# Patient Record
Sex: FEMALE | Race: WHITE | NOT HISPANIC OR LATINO | Employment: OTHER | URBAN - METROPOLITAN AREA
[De-identification: names, ages, dates, MRNs, and addresses within clinical notes are randomized per-mention and may not be internally consistent; named-entity substitution may affect disease eponyms.]

---

## 2024-02-12 ENCOUNTER — OFFICE VISIT (OUTPATIENT)
Dept: OTOLARYNGOLOGY | Facility: CLINIC | Age: 89
End: 2024-02-12
Payer: MEDICARE

## 2024-02-12 ENCOUNTER — CLINICAL SUPPORT (OUTPATIENT)
Dept: AUDIOLOGY | Facility: CLINIC | Age: 89
End: 2024-02-12
Payer: MEDICARE

## 2024-02-12 VITALS — BODY MASS INDEX: 33.26 KG/M2 | WEIGHT: 165 LBS | HEIGHT: 59 IN

## 2024-02-12 DIAGNOSIS — H90.3 SENSORINEURAL HEARING LOSS (SNHL) OF BOTH EARS: Primary | ICD-10-CM

## 2024-02-12 DIAGNOSIS — H61.21 IMPACTED CERUMEN OF RIGHT EAR: ICD-10-CM

## 2024-02-12 DIAGNOSIS — H69.92 DYSFUNCTION OF LEFT EUSTACHIAN TUBE: ICD-10-CM

## 2024-02-12 DIAGNOSIS — H93.13 TINNITUS OF BOTH EARS: ICD-10-CM

## 2024-02-12 PROBLEM — H43.813 PVD (POSTERIOR VITREOUS DETACHMENT), BILATERAL: Status: ACTIVE | Noted: 2017-12-01

## 2024-02-12 PROBLEM — I48.0 PAF (PAROXYSMAL ATRIAL FIBRILLATION) (MULTI): Status: ACTIVE | Noted: 2019-10-31

## 2024-02-12 PROBLEM — H35.3290 EXUDATIVE AGE-RELATED MACULAR DEGENERATION (MULTI): Status: ACTIVE | Noted: 2018-12-10

## 2024-02-12 PROBLEM — H04.123 DRY EYE SYNDROME OF BILATERAL LACRIMAL GLANDS: Status: ACTIVE | Noted: 2021-01-06

## 2024-02-12 PROBLEM — E66.811 OBESITY, CLASS I, BMI 30-34.9: Chronic | Status: ACTIVE | Noted: 2019-06-15

## 2024-02-12 PROBLEM — N18.30 STAGE 3 CHRONIC KIDNEY DISEASE (MULTI): Status: ACTIVE | Noted: 2019-10-31

## 2024-02-12 PROBLEM — H61.23 BILATERAL IMPACTED CERUMEN: Status: ACTIVE | Noted: 2024-02-12

## 2024-02-12 PROBLEM — E66.9 OBESITY, CLASS I, BMI 30-34.9: Chronic | Status: ACTIVE | Noted: 2019-06-15

## 2024-02-12 PROBLEM — H35.3112 NONEXUDATIVE AGE-RELATED MACULAR DEGENERATION, RIGHT EYE, INTERMEDIATE DRY STAGE: Status: ACTIVE | Noted: 2017-07-10

## 2024-02-12 PROBLEM — R94.39 ABNORMAL NUCLEAR STRESS TEST: Status: ACTIVE | Noted: 2019-10-31

## 2024-02-12 PROBLEM — H93.293 ABNORMAL AUDITORY PERCEPTION OF BOTH EARS: Status: ACTIVE | Noted: 2024-02-12

## 2024-02-12 PROBLEM — Z96.1 PSEUDOPHAKIA OF BOTH EYES: Status: ACTIVE | Noted: 2018-12-10

## 2024-02-12 PROBLEM — I25.10 CORONARY ARTERY DISEASE INVOLVING NATIVE CORONARY ARTERY OF NATIVE HEART WITHOUT ANGINA PECTORIS: Status: ACTIVE | Noted: 2019-10-31

## 2024-02-12 PROBLEM — I51.9 LEFT VENTRICULAR DYSFUNCTION: Status: ACTIVE | Noted: 2019-10-31

## 2024-02-12 PROBLEM — H74.03 TYMPANOSCLEROSIS, BILATERAL: Status: ACTIVE | Noted: 2024-02-12

## 2024-02-12 PROBLEM — I70.0 ATHEROSCLEROSIS OF AORTA (CMS-HCC): Status: ACTIVE | Noted: 2024-02-12

## 2024-02-12 PROCEDURE — 99214 OFFICE O/P EST MOD 30 MIN: CPT | Performed by: OTOLARYNGOLOGY

## 2024-02-12 PROCEDURE — 1036F TOBACCO NON-USER: CPT | Performed by: OTOLARYNGOLOGY

## 2024-02-12 PROCEDURE — 1160F RVW MEDS BY RX/DR IN RCRD: CPT | Performed by: OTOLARYNGOLOGY

## 2024-02-12 PROCEDURE — 92557 COMPREHENSIVE HEARING TEST: CPT | Performed by: AUDIOLOGIST

## 2024-02-12 PROCEDURE — 92567 TYMPANOMETRY: CPT | Performed by: AUDIOLOGIST

## 2024-02-12 PROCEDURE — 1159F MED LIST DOCD IN RCRD: CPT | Performed by: OTOLARYNGOLOGY

## 2024-02-12 RX ORDER — FUROSEMIDE 20 MG/1
TABLET ORAL
COMMUNITY
Start: 2020-01-19

## 2024-02-12 RX ORDER — VIT A/VIT C/VIT E/ZINC/COPPER 2148-113
TABLET ORAL EVERY 12 HOURS
COMMUNITY

## 2024-02-12 RX ORDER — ACETAMINOPHEN 500 MG
TABLET ORAL EVERY 24 HOURS
COMMUNITY

## 2024-02-12 RX ORDER — PANTOPRAZOLE SODIUM 40 MG/1
40 TABLET, DELAYED RELEASE ORAL DAILY
COMMUNITY

## 2024-02-12 RX ORDER — LEVOTHYROXINE SODIUM 100 UG/1
1 TABLET ORAL DAILY
COMMUNITY
Start: 2022-01-14

## 2024-02-12 RX ORDER — PROPYLENE GLYCOL 0.06 MG/ML
EMULSION OPHTHALMIC
COMMUNITY

## 2024-02-12 RX ORDER — ROSUVASTATIN CALCIUM 10 MG/1
TABLET, COATED ORAL EVERY 24 HOURS
COMMUNITY
Start: 2022-07-26

## 2024-02-12 RX ORDER — MULTIVITAMIN
TABLET ORAL EVERY 24 HOURS
COMMUNITY

## 2024-02-12 RX ORDER — NITROGLYCERIN 0.4 MG/1
TABLET SUBLINGUAL
COMMUNITY
Start: 2019-10-31

## 2024-02-12 RX ORDER — AMIODARONE HYDROCHLORIDE 200 MG/1
100 TABLET ORAL
COMMUNITY
Start: 2022-07-18

## 2024-02-12 RX ORDER — BUPROPION HYDROCHLORIDE 150 MG/1
1 TABLET, EXTENDED RELEASE ORAL
COMMUNITY
Start: 2022-02-14

## 2024-02-12 RX ORDER — ACETAMINOPHEN 500 MG
TABLET ORAL
COMMUNITY

## 2024-02-12 NOTE — PROGRESS NOTES
[facility]UD[facility]  AUDIOMETRIC EVALUATION      Name:  Bisi Estrella   :  1931  Age:  89 y.o.  Date of Evaluation:  24    HISTORY    Bisi Estrella is seen today at the request of Raul Alicea M.D., HALEIGH., F.A.C.S.    EVALUATION  See scanned audiogram in Media and included at the end of this report.    RESULTS    Otoscopic Evaluation:  Right Ear:  clear   Left Ear:  clear    Tympanometry:   Right Ear:  Type A, consistent with normal eardrum mobility and middle ear pressure   Left Ear:  Type C, consistent with negative middle ear pressure    Ipsilateral acoustic reflexes were not completed    Pure Tone Audiometry:    Right Ear:  mild to severe sensorineural hearing loss  Left Ear:  mild to severe sensorineural hearing loss       Speech Audiometry:    Right Ear:  excellent in quiet at an elevated presentation level  Left Ear:  excellent in quiet at an elevated presentation level  Speech reception thresholds were in good agreement with pure tone testing.    DISCUSSION  Results were relayed to Raul Alicea M.D., HALEIGH., F.A.C.S.    APPOINTMENT TIME  1:00-1:30pm      Mindi Abebe  Doctor of Audiology  Licensed Audiologist

## 2024-02-12 NOTE — PROGRESS NOTES
Subjective   Patient ID: Bisi Estrella is a 89 y.o. female who presents for No chief complaint on file..  HPI  This 89-year-old female is being seen back in the office for recheck.  She was last seen in June 2022.  She was having difficulties with hearing at that time.  Previous debridement of cerumen was required and hearing test done at that time revealed evidence for a mild conductive hearing loss in the right ear at 4000 Hz similar on the left with some mild asymmetry in the low frequencies.  Discrimination scores were 100% on the right 92% on the left type C pattern noted on her tympanograms.  Ceruminous buildup at that time required debridement using microscopy.    Review of Systems  A 12 point ROS has been reviewed and are negative for complaint except what is stated in the history of present illness and/or past medical history as noted in the EMR    Objective   Physical Exam  EXAMINATION:     GENERAL SAMUEL.EARANCE: Alert, elderly female in no acute distress, normal pitch and clarity of voice, nourished, cooperative.     HEAD/FACE: Normocephalic, atraumatic, normal facial movements and strength, no no tenderness to palpation, no lesions noted.     SKIN: Normal turgor, no raised or ulcerative lesions, warm and dry to palpation.     EYES: Extraocular motions intact, no nystagmus noted, pupils equal and reactive to light and accommodation, no conjunctivitis.     EARS: Both ears--external ear anatomy is normal without lesions, auditory canals are patent and without skin abrasions or lesions, both ears were obstructed by cerumen which is removed otoscopically from the right ear, hearing is intact to voice, tympanic membranes are intact with no acute inflammation, light reflexes present, no effusions are noted and no mastoid tenderness found to palpation.     NOSE: No external skin lesions are noted, nares are patent, septum is intact, sinuses are nontender to palpation bilaterally, no intranasal lesions or  inflammation is noted, nasal valve is normal.     OROPHARYNX/ORAL CAVITY: Oropharynx is not inflamed and is without lesions, mucosa of the oral cavity is intact and without lesions, tongue is midline and mobile, no acute dental disease is noted, TMJs are mobile     NECK: No lymphadenopathy is palpated, carotid pulses are intact, neck is supple with full range of motion, no thyroid abnormalities are noted, trachea is midline, no neck masses are palpated.     LYMPHATICS: No cervical adenopathy or supraclavicular adenopathy is palpated.     NEUROLOGIC/PSYCH; alert and oriented, cranial nerves are grossly intact, gait is without falling, no motor deficits are noted.    Audiogram today revealed evidence for sensorineural hearing loss in both ears moderate in the low frequencies up to 500 Hz mild in the mid frequencies moderate to moderately severe in the upper frequencies of sound symmetric.  Discrimination scores 100% on the right 96% on the left at 65 dB.  Tympanograms reveal a type a pattern on the right mild type C on the left  Assessment/Plan   Problem List Items Addressed This Visit    None  Visit Diagnoses         Codes    Sensorineural hearing loss (SNHL) of both ears    -  Primary H90.3    Tinnitus of both ears     H93.13    Impacted cerumen of right ear     H61.21             I discussed the present hearing test findings with the patient. Since the last test there has been mild decrease in the hearing of individual frequencies sounds compared to 2022.. Discrimination ability remains basically unchanged. It would be advised that a yearly audiogram be done unless symptoms develop in regards to progressive loss, new onset vertigo, or changes regarding tinnitus. Avoidance of loud noise without ear protection is advised. Rehabilitation using hearing aids is advised if she wishes to improve her hearing function image can also be used as a tinnitus masking device.  She is describing the sound of tinnitus at night  specially when she lays on her left ear which is a symptom of hearing loss.  Her exam today did not show evidence for inflammation or obstruction behind the tympanic membrane.  Yearly recheck was suggested.    Raul Alicea DMD, MD 02/12/24 1:03 PM

## 2025-02-17 ENCOUNTER — APPOINTMENT (OUTPATIENT)
Dept: OTOLARYNGOLOGY | Facility: CLINIC | Age: OVER 89
End: 2025-02-17
Payer: MEDICARE

## 2025-02-17 ENCOUNTER — APPOINTMENT (OUTPATIENT)
Dept: AUDIOLOGY | Facility: CLINIC | Age: OVER 89
End: 2025-02-17
Payer: MEDICARE

## 2025-06-18 ENCOUNTER — APPOINTMENT (OUTPATIENT)
Dept: AUDIOLOGY | Facility: CLINIC | Age: OVER 89
End: 2025-06-18
Payer: MEDICARE

## 2025-06-18 ENCOUNTER — APPOINTMENT (OUTPATIENT)
Dept: OTOLARYNGOLOGY | Facility: CLINIC | Age: OVER 89
End: 2025-06-18
Payer: MEDICARE

## 2025-08-20 ENCOUNTER — APPOINTMENT (OUTPATIENT)
Dept: AUDIOLOGY | Facility: CLINIC | Age: OVER 89
End: 2025-08-20
Payer: MEDICARE

## 2025-08-20 ENCOUNTER — APPOINTMENT (OUTPATIENT)
Dept: OTOLARYNGOLOGY | Facility: CLINIC | Age: OVER 89
End: 2025-08-20
Payer: MEDICARE

## 2025-08-20 VITALS — HEIGHT: 59 IN | BODY MASS INDEX: 33.26 KG/M2 | WEIGHT: 165 LBS

## 2025-08-20 DIAGNOSIS — I48.0 PAF (PAROXYSMAL ATRIAL FIBRILLATION) (MULTI): ICD-10-CM

## 2025-08-20 DIAGNOSIS — H90.A32 MIXED CONDUCTIVE AND SENSORINEURAL HEARING LOSS OF LEFT EAR WITH RESTRICTED HEARING OF RIGHT EAR: Primary | ICD-10-CM

## 2025-08-20 DIAGNOSIS — H90.A21 SENSORINEURAL HEARING LOSS (SNHL) OF RIGHT EAR WITH RESTRICTED HEARING OF LEFT EAR: ICD-10-CM

## 2025-08-20 DIAGNOSIS — H93.13 TINNITUS OF BOTH EARS: ICD-10-CM

## 2025-08-20 DIAGNOSIS — H90.3 SENSORINEURAL HEARING LOSS (SNHL) OF BOTH EARS: Primary | ICD-10-CM

## 2025-08-20 DIAGNOSIS — H61.23 BILATERAL IMPACTED CERUMEN: ICD-10-CM

## 2025-08-20 PROCEDURE — 1036F TOBACCO NON-USER: CPT | Performed by: OTOLARYNGOLOGY

## 2025-08-20 PROCEDURE — 99213 OFFICE O/P EST LOW 20 MIN: CPT | Performed by: OTOLARYNGOLOGY

## 2025-08-20 PROCEDURE — 69210 REMOVE IMPACTED EAR WAX UNI: CPT | Performed by: OTOLARYNGOLOGY

## 2025-08-20 PROCEDURE — 92557 COMPREHENSIVE HEARING TEST: CPT | Performed by: AUDIOLOGIST

## 2025-08-20 PROCEDURE — 92567 TYMPANOMETRY: CPT | Performed by: AUDIOLOGIST

## 2025-08-20 PROCEDURE — 1159F MED LIST DOCD IN RCRD: CPT | Performed by: OTOLARYNGOLOGY

## 2025-08-20 PROCEDURE — 1160F RVW MEDS BY RX/DR IN RCRD: CPT | Performed by: OTOLARYNGOLOGY

## 2025-08-20 RX ORDER — QUETIAPINE FUMARATE 25 MG/1
25 TABLET, FILM COATED ORAL NIGHTLY
COMMUNITY
Start: 2025-06-12

## 2025-08-20 RX ORDER — DILTIAZEM HYDROCHLORIDE 120 MG/1
120 CAPSULE, COATED, EXTENDED RELEASE ORAL
COMMUNITY
Start: 2025-04-27

## 2025-08-20 RX ORDER — LORATADINE 10 MG/1
1 TABLET ORAL 2 TIMES DAILY
COMMUNITY

## 2025-08-20 RX ORDER — FLUOXETINE 10 MG/1
10 CAPSULE ORAL DAILY
COMMUNITY
Start: 2025-06-21

## 2025-08-20 RX ORDER — PSYLLIUM HUSK 0.4 G
CAPSULE ORAL DAILY
COMMUNITY

## 2026-08-25 ENCOUNTER — APPOINTMENT (OUTPATIENT)
Dept: AUDIOLOGY | Facility: CLINIC | Age: OVER 89
End: 2026-08-25
Payer: MEDICARE

## 2026-08-25 ENCOUNTER — APPOINTMENT (OUTPATIENT)
Dept: OTOLARYNGOLOGY | Facility: CLINIC | Age: OVER 89
End: 2026-08-25
Payer: MEDICARE